# Patient Record
Sex: FEMALE | URBAN - METROPOLITAN AREA
[De-identification: names, ages, dates, MRNs, and addresses within clinical notes are randomized per-mention and may not be internally consistent; named-entity substitution may affect disease eponyms.]

---

## 2022-08-09 ENCOUNTER — APPOINTMENT (RX ONLY)
Dept: URBAN - METROPOLITAN AREA CLINIC 151 | Facility: CLINIC | Age: 47
Setting detail: DERMATOLOGY
End: 2022-08-09

## 2022-08-09 DIAGNOSIS — L30.9 DERMATITIS, UNSPECIFIED: ICD-10-CM

## 2022-08-09 PROCEDURE — ? PRESCRIPTION MEDICATION MANAGEMENT

## 2022-08-09 PROCEDURE — ? PRESCRIPTION

## 2022-08-09 PROCEDURE — 99203 OFFICE O/P NEW LOW 30 MIN: CPT

## 2022-08-09 PROCEDURE — ? DIAGNOSIS COMMENT

## 2022-08-09 PROCEDURE — ? COUNSELING

## 2022-08-09 RX ORDER — TAPINAROF 10 MG/1000MG
CREAM TOPICAL
Qty: 60 | Refills: 5 | Status: ERX | COMMUNITY
Start: 2022-08-09

## 2022-08-09 RX ORDER — CALCIPOTRIENE AND BETAMETHASONE DIPROPIONATE 50; 64 UG/G; MG/G
CREAM TOPICAL
Qty: 60 | Refills: 3 | Status: ERX | COMMUNITY
Start: 2022-08-09

## 2022-08-09 RX ADMIN — CALCIPOTRIENE AND BETAMETHASONE DIPROPIONATE: 50; 64 CREAM TOPICAL at 00:00

## 2022-08-09 RX ADMIN — TAPINAROF: 10 CREAM TOPICAL at 00:00

## 2022-08-09 ASSESSMENT — BSA RASH: BSA RASH: 1

## 2022-08-09 ASSESSMENT — SEVERITY ASSESSMENT: SEVERITY: MILD

## 2022-08-09 NOTE — HPI: RASH (ECZEMA)
How Severe Is Your Eczema?: moderate
Is This A New Presentation, Or A Follow-Up?: Rash
Additional History: Patient reports no change with antifungal topical or oral terbinafine, but reduction of itch with triamcinolone.

## 2022-08-09 NOTE — PROCEDURE: PRESCRIPTION MEDICATION MANAGEMENT
Initiate Treatment: Wynzora cream BID for 8 weeks, then weekends for maintenance\\nVtama cream BID Monday-Friday
Detail Level: Zone
Samples Given: Vtama, Wynzora, Aveeno Eczema Therapy balm
Render In Strict Bullet Format?: No

## 2022-08-09 NOTE — PROCEDURE: MIPS QUALITY
Quality 431: Preventive Care And Screening: Unhealthy Alcohol Use - Screening: Patient not identified as an unhealthy alcohol user when screened for unhealthy alcohol use using a systematic screening method
Quality 402: Tobacco Use And Help With Quitting Among Adolescents: Patient screened for tobacco and never smoked
Detail Level: Detailed
Quality 130: Documentation Of Current Medications In The Medical Record: Current Medications Documented
Quality 226: Preventive Care And Screening: Tobacco Use: Screening And Cessation Intervention: Patient screened for tobacco use and is an ex/non-smoker
Quality 110: Preventive Care And Screening: Influenza Immunization: Influenza Immunization Administered during Influenza season

## 2022-08-09 NOTE — PROCEDURE: DIAGNOSIS COMMENT
Render Risk Assessment In Note?: no
Detail Level: Zone
Comment: Psoriasis vs. Eczema? More likely PSO. Pt previously treated with oral terbinafine and OTC antifungal with no change, but reports itch relief with TAC. Discussed starting treatment with Wynzora and Vtama.